# Patient Record
Sex: FEMALE | Race: BLACK OR AFRICAN AMERICAN | Employment: UNEMPLOYED | URBAN - METROPOLITAN AREA
[De-identification: names, ages, dates, MRNs, and addresses within clinical notes are randomized per-mention and may not be internally consistent; named-entity substitution may affect disease eponyms.]

---

## 2023-04-05 ENCOUNTER — APPOINTMENT (OUTPATIENT)
Dept: CT IMAGING | Age: 38
End: 2023-04-05
Attending: EMERGENCY MEDICINE

## 2023-04-05 ENCOUNTER — HOSPITAL ENCOUNTER (OUTPATIENT)
Age: 38
End: 2023-04-05
Attending: EMERGENCY MEDICINE

## 2023-04-05 ENCOUNTER — APPOINTMENT (OUTPATIENT)
Dept: GENERAL RADIOLOGY | Age: 38
End: 2023-04-05
Attending: EMERGENCY MEDICINE

## 2023-04-05 LAB
ALBUMIN SERPL-MCNC: 3.8 G/DL (ref 3.5–5)
ALBUMIN/GLOB SERPL: 0.9 (ref 1.1–2.2)
ALP SERPL-CCNC: 50 U/L (ref 45–117)
ALT SERPL-CCNC: 26 U/L (ref 12–78)
ANION GAP SERPL CALC-SCNC: 6 MMOL/L (ref 5–15)
APAP SERPL-MCNC: <2 UG/ML (ref 10–30)
AST SERPL-CCNC: 44 U/L (ref 15–37)
ATRIAL RATE: 94 BPM
BASOPHILS # BLD: 0.1 K/UL (ref 0–0.1)
BASOPHILS NFR BLD: 1 % (ref 0–1)
BILIRUB SERPL-MCNC: 1.1 MG/DL (ref 0.2–1)
BUN SERPL-MCNC: 16 MG/DL (ref 6–20)
BUN/CREAT SERPL: 16 (ref 12–20)
CALCIUM SERPL-MCNC: 9.3 MG/DL (ref 8.5–10.1)
CALCULATED P AXIS, ECG09: 61 DEGREES
CALCULATED R AXIS, ECG10: 10 DEGREES
CALCULATED T AXIS, ECG11: 37 DEGREES
CHLORIDE SERPL-SCNC: 111 MMOL/L (ref 97–108)
CO2 SERPL-SCNC: 23 MMOL/L (ref 21–32)
COMMENT, HOLDF: NORMAL
CREAT SERPL-MCNC: 1.03 MG/DL (ref 0.55–1.02)
DIAGNOSIS, 93000: NORMAL
DIFFERENTIAL METHOD BLD: ABNORMAL
EOSINOPHIL # BLD: 0 K/UL (ref 0–0.4)
EOSINOPHIL NFR BLD: 0 % (ref 0–7)
ERYTHROCYTE [DISTWIDTH] IN BLOOD BY AUTOMATED COUNT: 13.5 % (ref 11.5–14.5)
ETHANOL SERPL-MCNC: <10 MG/DL
GLOBULIN SER CALC-MCNC: 4.1 G/DL (ref 2–4)
GLUCOSE SERPL-MCNC: 91 MG/DL (ref 65–100)
HCG SERPL QL: NEGATIVE
HCT VFR BLD AUTO: 39.5 % (ref 35–47)
HGB BLD-MCNC: 13.5 G/DL (ref 11.5–16)
IMM GRANULOCYTES # BLD AUTO: 0 K/UL (ref 0–0.04)
IMM GRANULOCYTES NFR BLD AUTO: 0 % (ref 0–0.5)
LYMPHOCYTES # BLD: 1.8 K/UL (ref 0.8–3.5)
LYMPHOCYTES NFR BLD: 19 % (ref 12–49)
MCH RBC QN AUTO: 25.7 PG (ref 26–34)
MCHC RBC AUTO-ENTMCNC: 34.2 G/DL (ref 30–36.5)
MCV RBC AUTO: 75.1 FL (ref 80–99)
MONOCYTES # BLD: 1.1 K/UL (ref 0–1)
MONOCYTES NFR BLD: 12 % (ref 5–13)
NEUTS SEG # BLD: 6.6 K/UL (ref 1.8–8)
NEUTS SEG NFR BLD: 68 % (ref 32–75)
NRBC # BLD: 0 K/UL (ref 0–0.01)
NRBC BLD-RTO: 0 PER 100 WBC
P-R INTERVAL, ECG05: 140 MS
PLATELET # BLD AUTO: 247 K/UL (ref 150–400)
PMV BLD AUTO: 10.7 FL (ref 8.9–12.9)
POTASSIUM SERPL-SCNC: 4.4 MMOL/L (ref 3.5–5.1)
PROT SERPL-MCNC: 7.9 G/DL (ref 6.4–8.2)
Q-T INTERVAL, ECG07: 334 MS
QRS DURATION, ECG06: 66 MS
QTC CALCULATION (BEZET), ECG08: 417 MS
RBC # BLD AUTO: 5.26 M/UL (ref 3.8–5.2)
SALICYLATES SERPL-MCNC: <1.7 MG/DL (ref 2.8–20)
SAMPLES BEING HELD,HOLD: NORMAL
SODIUM SERPL-SCNC: 140 MMOL/L (ref 136–145)
TSH SERPL DL<=0.05 MIU/L-ACNC: 2.11 UIU/ML (ref 0.36–3.74)
VENTRICULAR RATE, ECG03: 94 BPM
WBC # BLD AUTO: 9.6 K/UL (ref 3.6–11)

## 2023-04-05 PROCEDURE — 70450 CT HEAD/BRAIN W/O DYE: CPT

## 2023-04-05 PROCEDURE — 36415 COLL VENOUS BLD VENIPUNCTURE: CPT

## 2023-04-05 PROCEDURE — 71045 X-RAY EXAM CHEST 1 VIEW: CPT

## 2023-04-05 PROCEDURE — 80053 COMPREHEN METABOLIC PANEL: CPT

## 2023-04-05 PROCEDURE — 74011250636 HC RX REV CODE- 250/636: Performed by: EMERGENCY MEDICINE

## 2023-04-05 PROCEDURE — 85025 COMPLETE CBC W/AUTO DIFF WBC: CPT

## 2023-04-05 PROCEDURE — 93005 ELECTROCARDIOGRAM TRACING: CPT

## 2023-04-05 PROCEDURE — 84703 CHORIONIC GONADOTROPIN ASSAY: CPT

## 2023-04-05 PROCEDURE — 80143 DRUG ASSAY ACETAMINOPHEN: CPT

## 2023-04-05 PROCEDURE — 80179 DRUG ASSAY SALICYLATE: CPT

## 2023-04-05 PROCEDURE — 82077 ASSAY SPEC XCP UR&BREATH IA: CPT

## 2023-04-05 PROCEDURE — 84443 ASSAY THYROID STIM HORMONE: CPT

## 2023-04-05 RX ORDER — HALOPERIDOL 5 MG/ML
5 INJECTION INTRAMUSCULAR ONCE
Status: COMPLETED
Start: 2023-04-05 | End: 2023-04-05

## 2023-04-05 RX ORDER — LORAZEPAM 2 MG/ML
INJECTION INTRAMUSCULAR
Status: DISCONTINUED
Start: 2023-04-05 | End: 2023-04-05 | Stop reason: WASHOUT

## 2023-04-05 RX ORDER — LORAZEPAM 2 MG/ML
2 INJECTION INTRAMUSCULAR ONCE
Status: COMPLETED
Start: 2023-04-05 | End: 2023-04-05

## 2023-04-05 RX ORDER — HALOPERIDOL 5 MG/ML
INJECTION INTRAMUSCULAR
Status: DISCONTINUED
Start: 2023-04-05 | End: 2023-04-05 | Stop reason: WASHOUT

## 2023-04-05 RX ADMIN — SODIUM CHLORIDE 1000 ML: 9 INJECTION, SOLUTION INTRAVENOUS at 20:01

## 2023-04-05 RX ADMIN — HALOPERIDOL LACTATE 5 MG: 5 INJECTION, SOLUTION INTRAMUSCULAR at 18:37

## 2023-04-05 RX ADMIN — LORAZEPAM 2 MG: 2 INJECTION INTRAMUSCULAR; INTRAVENOUS at 18:38

## 2023-04-05 NOTE — ED NOTES
Officer approached the nurses station requesting that the patient be placed in restraints- Dr Lex Owen made aware. Discussed with Dr Lex Owen that patient is currently sleeping and does not require restraints. Forensic restraints remain in place.  Dr Lex Owen also discussed with the officers as the bedside that restraints are not required as this time and will remain in forensics restraints

## 2023-04-05 NOTE — ED TRIAGE NOTES
Patient arrived in handcuffs with EMS and [facility] police. EMS and PD report the patient was found sitting her vehicle on the side of the road and the patient became aggressive. It is unsure if the patient was in her vehicle naked or became naked once PD became involved. Patient is talking to herself and physically aggressive with PD. Patient was placed under an ECO at [id number] brought to the ED in handcuffs. Patients brother was contacted and per PD was not forth coming with information but did provide the patients name,  and that she has a psychiatric history. Patients brother reports he is on his way to the hospital but it appears as if the patient resides in [place].

## 2023-04-05 NOTE — ED PROVIDER NOTES
HPI   70-year-old female presenting to the emergency department due to concerns for mental health issue. Patient arrives via EMS and was [place] PD. Patient was found sitting in her vehicle on the side of the road. She became aggressive. At some point she became naked when PD got involved. She was responding to internal stimuli in route. Her blood sugar was normal.  She was physically aggressive with PD. She was placed under an ECO. Apparently her brother was contacted and stated that she had mental health issues but was not really forthcoming with any information. It seems like the patient resides in [place]. No other history can be obtained at this time. She was tachycardic in route. No past medical history on file. No past surgical history on file. No family history on file. Social History     Socioeconomic History    Marital status: UNKNOWN     Spouse name: Not on file    Number of children: Not on file    Years of education: Not on file    Highest education level: Not on file   Occupational History    Not on file   Tobacco Use    Smoking status: Not on file    Smokeless tobacco: Not on file   Substance and Sexual Activity    Alcohol use: Not on file    Drug use: Not on file    Sexual activity: Not on file   Other Topics Concern    Not on file   Social History Narrative    Not on file     Social Determinants of Health     Financial Resource Strain: Not on file   Food Insecurity: Not on file   Transportation Needs: Not on file   Physical Activity: Not on file   Stress: Not on file   Social Connections: Not on file   Intimate Partner Violence: Not on file   Housing Stability: Not on file         ALLERGIES: Patient has no known allergies.     Review of Systems  Unable to perform a complete review of systems secondary to the patient's altered mental status  Vitals:    04/05/23 1830   BP: (!) 140/68   Pulse: (!) 137   Resp: (!) 35   Temp: 98.7 °F (37.1 °C)   SpO2: 100%   Weight: 34 kg (75 lb)   Height: 5' 7\" (1.702 m)            Physical Exam  Constitutional:       Comments: Patient appears somewhat agitated. She looks scared. Not diaphoretic   HENT:      Mouth/Throat:      Comments: Moist mucous membranes  Eyes:      Comments: Pupils are 2 mm bilaterally. They are reactive to light. No exophthalmos   Neck:      Comments: Trachea midline. No thyromegaly appreciated  Cardiovascular:      Comments: Tachycardic. Rhythm seems regular on exam  Pulmonary:      Comments: Slightly tachypneic but this seems secondary to distress. No accessory muscle use. Abdominal:      Palpations: Abdomen is soft. Tenderness: There is no abdominal tenderness. Musculoskeletal:         General: No deformity. Normal range of motion. Cervical back: Normal range of motion. Skin:     General: Skin is warm and dry. Neurological:      Comments: Patient moving all extremities. She is awake. Not answering questions. Psychiatric:      Comments: Seems agitated and somewhat distressed. Responding to internal stimuli        Medical Decision Making  Amount and/or Complexity of Data Reviewed  Labs: ordered. Radiology: ordered. ECG/medicine tests: ordered. Risk  Prescription drug management. 29-year-old female presenting with the above chief complaint. She is tachypneic and tachycardic. Hemodynamically stable. Afebrile. She does seem to be responding to internal stimuli. She is being held down by police. As she is under an ECO, she will be chemically sedated so we can perform a work-up to rule out metabolic, infectious, and toxic abnormalities as to the cause of her symptoms.        Procedures

## 2023-04-05 NOTE — ED NOTES
PD on the phone with the patients brother who denies any medical history, denies allergies and denies daily medication use. Patients brother also denies drug or alcohol use.

## 2023-04-05 NOTE — ED NOTES
Per [place] PD the patients vehicle was picked up by Alejandrina Palomo and transported to there facility

## 2023-04-06 ENCOUNTER — HOSPITAL ENCOUNTER (OUTPATIENT)
Age: 38
End: 2023-04-06
Attending: STUDENT IN AN ORGANIZED HEALTH CARE EDUCATION/TRAINING PROGRAM

## 2023-04-06 PROCEDURE — 74011250637 HC RX REV CODE- 250/637: Performed by: EMERGENCY MEDICINE

## 2023-04-06 RX ADMIN — CEFDINIR 300 MG: 300 CAPSULE ORAL at 05:54

## 2023-04-06 NOTE — CONSULTS
PSYCHIATRY CONSULT NOTE    REASON FOR CONSULT:      HISTORY OF PRESENTING COMPLAINT:  Jaja Bermudez is a 30 y.o. BLACK/ female who is currently seen in the ED at [facility]. Patient brought in police custody after being found sitting naked in her vehicle on the side of the road. She became physically aggressive to the [employer] when they approached her. Upon my assessment, patient is alert and oriented X4, calm and cooperative. She denies current SI/HI/AVH. She recalls having AH and reports AVH \"at times\" when she takes marijuana. She reports it happened before in the past in 2008 and she was admitted in [facility] in [place]. She believes it must have been caused by smoking marijuana. She reports she takes [place] lot\" of marijuana. She endorses depression \"at times\" and  attributes it to stress related to her job. 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 She reports her sleep and appetite is improving. She is still interested in inpatient BHU though she is worried about falling behind on her bills. PAST PSYCHIATRIC HISTORY:  Hx of prior inpatient psychiatric admission in 2008 after a similar episode. Denies hx of suicide attempt. No outpatient mental health services. SUBSTANCE ABUSE HISTORY:  Marijuana    PAST MEDICAL HISTORY:    Please see H&P for details. No past medical history on file.   Prior to Admission medications    Not on File     Vitals:    04/05/23 2226 04/06/23 0113 04/06/23 0556 04/06/23 0732   BP:   102/64 104/73   Pulse:   86 80   Resp:   14 18   Temp:    97.8 °F (36.6 °C)   SpO2: 94% 99% 99% 100%   Weight:       Height:         Lab Results   Component Value Date/Time    WBC 9.6 04/05/2023 07:56 PM    HGB 13.5 04/05/2023 07:56 PM    HCT 39.5 04/05/2023 07:56 PM    PLATELET 248 81/76/3550 07:56 PM    MCV 75.1 (L) 04/05/2023 07:56 PM     Lab Results   Component Value Date/Time    Sodium 140 04/05/2023 07:59 PM    Potassium 4.4 04/05/2023 07:59 PM    Chloride 111 (H) 04/05/2023 07:59 PM    CO2 23 04/05/2023 07:59 PM    Anion gap 6 04/05/2023 07:59 PM    Glucose 91 04/05/2023 07:59 PM    BUN 16 04/05/2023 07:59 PM    Creatinine 1.03 (H) 04/05/2023 07:59 PM    BUN/Creatinine ratio 16 04/05/2023 07:59 PM    Calcium 9.3 04/05/2023 07:59 PM    Bilirubin, total 1.1 (H) 04/05/2023 07:59 PM    Alk. phosphatase 50 04/05/2023 07:59 PM    Protein, total 7.9 04/05/2023 07:59 PM    Albumin 3.8 04/05/2023 07:59 PM    Globulin 4.1 (H) 04/05/2023 07:59 PM    A-G Ratio 0.9 (L) 04/05/2023 07:59 PM    ALT (SGPT) 26 04/05/2023 07:59 PM    AST (SGOT) 44 (H) 04/05/2023 07:59 PM     No results found for: VALF2, VALAC, VALP, VALPR, DS6, CRBAM, CRBAMP, CARB2, XCRBAM  No results found for: LITHM  RADIOLOGY REPORTS:(reviewed/updated 4/6/2023)  CT HEAD WO CONT    Result Date: 4/5/2023  EXAM: CT HEAD WO CONT INDICATION: altered mental status COMPARISON: None. CONTRAST: None. TECHNIQUE: Unenhanced CT of the head was performed using 5 mm images. Brain and bone windows were generated. Coronal and sagittal reformats. CT dose reduction was achieved through use of a standardized protocol tailored for this examination and automatic exposure control for dose modulation. FINDINGS: There is no extra-axial fluid collection hemorrhage shift or masses. Negative. XR CHEST PORT    Result Date: 4/5/2023  INDICATION:  altered EXAM: Chest single view. COMPARISON: None. Megan Tim FINDINGS: A single frontal view of the chest at 1947 hours shows incomplete inspiratory effort with perihilar and bibasilar interstitial prominence and mild atelectasis. .  The heart, mediastinum and pulmonary vasculature are normal . The bony thorax is unremarkable for age. Megan Hays Perihilar and bibasilar atelectasis and mild interstitial prominence. .  .      Lab Results   Component Value Date/Time    HCG, Ql. Negative 04/05/2023 07:56 PM PSYCHOSOCIAL HISTORY: Patient is single, no child. MENTAL STATUS EXAM:    General appearance:  moderately  groomed, psychomotor activity is wnl  Eye contact: fair eye contact  Speech: Spontaneous, soft, decreased output. Affect : mood congruent  Mood: \"good \"  Thought Process: Logical, goal directed  Perception: Denies AH or VH. Thought Content: denies SI/HI or Plan  Insight: Partial  Judgement: Fair  Cognition: Intact grossly. 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 Please continue with bed search     Thank your your consult. Please feel free to consult us again as needed.

## 2023-04-06 NOTE — ED PROVIDER NOTES
7:30 AM  Change of shift. Care of patient taken over from Dr. Tish Hicks pending behavioral health placement. Patient subsequently voluntarily going to be admitted. 1:55 PM  I was notified by patient's nurse that patient is requesting to leave as she needs to  her car from [address] [place] [address]. Patient's family is with her. She is alert and oriented x4. She denies SI or HI. I rediscussed patient's case with Elisa Johnson, who notes that patient was accepted at [facility]. She will re-engage crisis. Patient was reevaluated by crisis who feels patient does not require TDO and she can be discharged with a safety plan. Patient and family were encouraged to return to the emergency department for continued stabilization of suspected undiagnosed mood disorder. They were notified that if the patient does leave at this time she will have to restart the work-up when she returns including repeat blood work and urine. Patient verbalized understanding and is still requesting to leave. Prescription was provided for urinary tract infection.     Monica Chung, DO

## 2023-04-06 NOTE — ED NOTES
11:52 PM  Change of shift. Care of patient taken over from Dr Ede Diez; H&P reviewed, bedside handoff complete. Awaiting assessment from crisis. I spoke with crisis worker after she was released from [facility] and police left. She is more coherent now and will not be placed under TDO. Apparently she had not been sleeping for several days and she does not recall why but she was driving in circles and then ended up running. Appears she has had a manic episode possibly related to stress of being between jobs. Her family arrived and this is not normal behavior for her. I strongly recommended psychiatric admission as I have concerns for her safety and feel she needs more time to stabilize. She is agreeable to admission to psychiatric facility. UA with + nitrites and moderate leuk esterase with heavy bacteriuria but is contaminated. Will culture and treat empirically with omnicef. Possible this is related to hygiene but I do not suspect potential urinary infection has anything to do with her current episode otherwise. MEDICALLY CLEAR FOR TRANSFER OR PSYCHIATRIC ADMISSION.     7:00 AM  Change of shift. Care of patient signed over to [address]. Bedside handoff complete. Awaiting placement.
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
## 2023-04-06 NOTE — ED NOTES
Reached out to [facility] non-emergency regarding pt's car location per pt request. Pt provided info to [employer]. Confirmed pt's car @ [employer]. Provided phone # to patient; room phone in reach.

## 2023-04-06 NOTE — ED NOTES
Introduced myself as primary RN. Assumed care of patient. Updated, discussed and explained plan of care to patient, including patient's expectations of visit. Pt given opportunity to ask questions. Pt advised that medical information will be discussed and it is their own responsibility to tell nurse if such conversation should not take place in the presence of visitors. Pt verbalizes understanding. Pt denies any additional complaints at this time. Pt resting on stretcher in low/locked position. Call bell in reach. Pt care whiteboard updated and approximate length of time for test results explained to patient. Pt given pitcher of ice water. Tolerated 100% of breakfast tray. Pt denies SI/HI. A&Ox4. Pt awaiting BH placement. Pt asking where her car was towed to- to reach out to [employer].     Elizabeth Orta RN

## 2023-04-06 NOTE — ED TRIAGE NOTES
Pt arrives to ED with cc of dehydration and depression. She states that she was just having some depressive feelings without any SI/HI. As she is stating how dehydrated she is, she pulls out chocolate milk from her purse as well as a meal and proceeds to eat it during triage. Denies hallucinations.

## 2023-04-06 NOTE — ED NOTES
MD Lencho Carter reviewed discharge instructions with the patient, parent, and brother. The patient, parent, and brother verbalized understanding. Pt confirmed understanding of need for follow up with primary care provider. Important sections of discharge instructions highlighted for patient. Pt is not in any current distress and shows no evidence of clinical instability. Pt is hemodynamically/respiratorily stable. Paperwork given by provider and reviewed with patient and their brother/mother, opportunity for questions/clarification given. Pt was given RX for omnicef. Pt was given work note if applicable/requested. Pt agreed to & signed safety plan- MD Almanza reviewed/signed. Faxed back to [facility]. Pt denies any additional complaints. Pt walked out of ED.     Patient Vitals for the past 4 hrs:   Temp Pulse Resp BP SpO2   04/06/23 1147 98.8 °F (37.1 °C) 91 18 103/66 100 %         Sulma Taylor RN
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
## 2023-04-06 NOTE — ED NOTES
Bedside and Verbal shift change report given to Hakeem Allison (oncoming nurse) by Natalio Michaud (offgoing nurse). Report included the following information SBAR, ED Summary, Procedure Summary and MAR.
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
## 2023-04-07 NOTE — ED PROVIDER NOTES
10:56 PM patient has been evaluated by [facility] and determined to not be a threat to herself or others. She declines psychiatric admission at this time and is cleared for discharge from the emergency department. Patient and/or family verbally conveyed their understanding and agreement of the patient's signs, symptoms, diagnosis, treatment and prognosis and additionally agree to follow up as recommended in the discharge instructions or to return to the Emergency Room should their condition change prior to their follow-up appointment. The patient/family verbally agrees with the care-plan and verbally conveys that all of their questions have been answered. The discharge instructions have also been provided to the patient and/or family with some educational information regarding the patient's diagnosis as well a list of reasons why the patient would want to return to the ER prior to their follow-up appointment, should their condition change.
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
## 2023-04-07 NOTE — ED NOTES
Patient discharged by provider, discharge instructions provided to patient and reviewed, all questions answered. Patient A/Ox4, breathing unlabored, VSS. Patient provided resources by [facility].  Patient ambulatory on discharge to ER gerber

## 2023-04-08 ENCOUNTER — HOSPITAL ENCOUNTER (OUTPATIENT)
Age: 38
End: 2023-04-08
Attending: STUDENT IN AN ORGANIZED HEALTH CARE EDUCATION/TRAINING PROGRAM

## (undated) LAB
ALBUMIN SERPL-MCNC: 3.6 G/DL (ref 3.5–5)
ALBUMIN/GLOB SERPL: 0.9 (ref 1.1–2.2)
ALP SERPL-CCNC: 55 U/L (ref 45–117)
ALT SERPL-CCNC: 34 U/L (ref 12–78)
AMPHET UR QL SCN: NEGATIVE
AMPHET UR QL SCN: NEGATIVE
ANION GAP SERPL CALC-SCNC: 5 MMOL/L (ref 5–15)
APAP SERPL-MCNC: <2 UG/ML (ref 10–30)
APPEARANCE UR: ABNORMAL
APPEARANCE UR: CLEAR
AST SERPL-CCNC: 50 U/L (ref 15–37)
BACTERIA URNS QL MICRO: ABNORMAL /HPF
BACTERIA URNS QL MICRO: NEGATIVE /HPF
BARBITURATES UR QL SCN: NEGATIVE
BARBITURATES UR QL SCN: NEGATIVE
BASOPHILS # BLD: 0.1 K/UL (ref 0–0.1)
BASOPHILS NFR BLD: 1 % (ref 0–1)
BENZODIAZ UR QL: NEGATIVE
BENZODIAZ UR QL: NEGATIVE
BILIRUB SERPL-MCNC: 0.6 MG/DL (ref 0.2–1)
BILIRUB UR QL: NEGATIVE
BILIRUB UR QL: NEGATIVE
BUN SERPL-MCNC: 17 MG/DL (ref 6–20)
BUN/CREAT SERPL: 19 (ref 12–20)
CALCIUM SERPL-MCNC: 9.5 MG/DL (ref 8.5–10.1)
CANNABINOIDS UR QL SCN: POSITIVE
CANNABINOIDS UR QL SCN: POSITIVE
CHLORIDE SERPL-SCNC: 106 MMOL/L (ref 97–108)
CO2 SERPL-SCNC: 25 MMOL/L (ref 21–32)
COCAINE UR QL SCN: NEGATIVE
COCAINE UR QL SCN: NEGATIVE
COLOR UR: (no result)
COLOR UR: ABNORMAL
COMMENT, HOLDF: (no result)
CREAT SERPL-MCNC: 0.91 MG/DL (ref 0.55–1.02)
DIFFERENTIAL METHOD BLD: (no result)
DRUG SCRN COMMENT,DRGCM: (no result)
DRUG SCRN COMMENT,DRGCM: ABNORMAL
EOSINOPHIL # BLD: 0.2 K/UL (ref 0–0.4)
EOSINOPHIL NFR BLD: 3 % (ref 0–7)
EPITH CASTS URNS QL MICRO: (no result) /LPF
EPITH CASTS URNS QL MICRO: ABNORMAL /LPF
ERYTHROCYTE [DISTWIDTH] IN BLOOD BY AUTOMATED COUNT: 13.6 % (ref 11.5–14.5)
ETHANOL SERPL-MCNC: <10 MG/DL
FLUAV RNA SPEC QL NAA+PROBE: NOT DETECTED
FLUBV RNA SPEC QL NAA+PROBE: NOT DETECTED
GLOBULIN SER CALC-MCNC: 4 G/DL (ref 2–4)
GLUCOSE SERPL-MCNC: 84 MG/DL (ref 65–100)
GLUCOSE UR STRIP.AUTO-MCNC: NEGATIVE MG/DL
GLUCOSE UR STRIP.AUTO-MCNC: NEGATIVE MG/DL
HCG UR QL: NEGATIVE
HCT VFR BLD AUTO: 41.8 % (ref 35–47)
HGB BLD-MCNC: 13.6 G/DL (ref 11.5–16)
HGB UR QL STRIP: NEGATIVE
HGB UR QL STRIP: NEGATIVE
HYALINE CASTS URNS QL MICRO: ABNORMAL /LPF (ref 0–2)
IMM GRANULOCYTES # BLD AUTO: 0 K/UL (ref 0–0.04)
IMM GRANULOCYTES NFR BLD AUTO: 0 % (ref 0–0.5)
KETONES UR QL STRIP.AUTO: 15 MG/DL
KETONES UR QL STRIP.AUTO: NEGATIVE MG/DL
LEUKOCYTE ESTERASE UR QL STRIP.AUTO: ABNORMAL
LEUKOCYTE ESTERASE UR QL STRIP.AUTO: NEGATIVE
LYMPHOCYTES # BLD: 2.3 K/UL (ref 0.8–3.5)
LYMPHOCYTES NFR BLD: 30 % (ref 12–49)
MCH RBC QN AUTO: 25.1 PG (ref 26–34)
MCHC RBC AUTO-ENTMCNC: 32.5 G/DL (ref 30–36.5)
MCV RBC AUTO: 77.1 FL (ref 80–99)
METHADONE UR QL: NEGATIVE
METHADONE UR QL: NEGATIVE
MONOCYTES # BLD: 0.6 K/UL (ref 0–1)
MONOCYTES NFR BLD: 8 % (ref 5–13)
NEUTS SEG # BLD: 4.6 K/UL (ref 1.8–8)
NEUTS SEG NFR BLD: 58 % (ref 32–75)
NITRITE UR QL STRIP.AUTO: NEGATIVE
NITRITE UR QL STRIP.AUTO: POSITIVE
NRBC # BLD: 0 K/UL (ref 0–0.01)
NRBC BLD-RTO: 0 PER 100 WBC
OPIATES UR QL: NEGATIVE
OPIATES UR QL: NEGATIVE
PCP UR QL: NEGATIVE
PCP UR QL: NEGATIVE
PH UR STRIP: 5.5 (ref 5–8)
PH UR STRIP: 6 (ref 5–8)
PLATELET # BLD AUTO: 226 K/UL (ref 150–400)
PMV BLD AUTO: 10.6 FL (ref 8.9–12.9)
POTASSIUM SERPL-SCNC: 4.1 MMOL/L (ref 3.5–5.1)
PROT SERPL-MCNC: 7.6 G/DL (ref 6.4–8.2)
PROT UR STRIP-MCNC: 30 MG/DL
PROT UR STRIP-MCNC: NEGATIVE MG/DL
RBC # BLD AUTO: 5.42 M/UL (ref 3.8–5.2)
RBC #/AREA URNS HPF: (no result) /HPF (ref 0–5)
RBC #/AREA URNS HPF: ABNORMAL /HPF (ref 0–5)
SALICYLATES SERPL-MCNC: <1.7 MG/DL (ref 2.8–20)
SAMPLES BEING HELD,HOLD: (no result)
SARS-COV-2 RNA RESP QL NAA+PROBE: NOT DETECTED
SODIUM SERPL-SCNC: 136 MMOL/L (ref 136–145)
SP GR UR REFRACTOMETRY: 1.01 (ref 1–1.03)
SP GR UR REFRACTOMETRY: 1.01 (ref 1–1.03)
UR CULT HOLD, URHOLD: (no result)
UROBILINOGEN UR QL STRIP.AUTO: 0.2 EU/DL (ref 0.2–1)
UROBILINOGEN UR QL STRIP.AUTO: 0.2 EU/DL (ref 0.2–1)
WBC # BLD AUTO: 7.8 K/UL (ref 3.6–11)
WBC URNS QL MICRO: (no result) /HPF (ref 0–4)
WBC URNS QL MICRO: ABNORMAL /HPF (ref 0–4)